# Patient Record
Sex: MALE
[De-identification: names, ages, dates, MRNs, and addresses within clinical notes are randomized per-mention and may not be internally consistent; named-entity substitution may affect disease eponyms.]

---

## 2018-09-24 ENCOUNTER — HOSPITAL ENCOUNTER (OUTPATIENT)
Dept: HOSPITAL 53 - M SMT | Age: 23
End: 2018-09-24
Attending: UROLOGY
Payer: COMMERCIAL

## 2018-09-24 DIAGNOSIS — N46.9: Primary | ICD-10-CM

## 2018-09-24 LAB
% NORMAL FORMS: (no result) % (ref 4–?)
IMMOTILITY: (no result) %
NON PROGRESSIVE MOTILITY (C): (no result) %
PROGRESSIVE MOTILITY (A): (no result) % (ref 32–?)
SAMPLE TYPE: (no result)
SEMEN APPEARANCE: (no result)
SEMEN VISCOSITY: (no result)
SEMEN VOLUME: (no result) ML (ref 4–5)
SPERM # SMN: (no result) M/ML (ref 15–?)
SPERM ABNORMAL FORMS: (no result)
SPERM#: (no result) M/EJAC (ref 39–?)
TOTAL FUNCTIONAL: (no result) M/EJAC.
TOTAL MOTILITY: (no result) % (ref 40–?)
TOTAL PROGRESSIVE SPERM: (no result) M/EJAC.
WBC # SMN AUTO: (no result) 10*3/UL

## 2021-04-21 PROBLEM — Z00.00 ENCOUNTER FOR PREVENTIVE HEALTH EXAMINATION: Status: ACTIVE | Noted: 2021-04-21

## 2021-07-12 ENCOUNTER — APPOINTMENT (OUTPATIENT)
Dept: ENDOCRINOLOGY | Facility: CLINIC | Age: 26
End: 2021-07-12

## 2021-08-30 ENCOUNTER — APPOINTMENT (OUTPATIENT)
Dept: PULMONOLOGY | Facility: CLINIC | Age: 26
End: 2021-08-30

## 2022-02-09 ENCOUNTER — APPOINTMENT (OUTPATIENT)
Dept: ENDOCRINOLOGY | Facility: CLINIC | Age: 27
End: 2022-02-09
Payer: COMMERCIAL

## 2022-02-09 ENCOUNTER — NON-APPOINTMENT (OUTPATIENT)
Age: 27
End: 2022-02-09

## 2022-02-09 VITALS
OXYGEN SATURATION: 98 % | SYSTOLIC BLOOD PRESSURE: 128 MMHG | WEIGHT: 315 LBS | BODY MASS INDEX: 44.1 KG/M2 | HEART RATE: 108 BPM | HEIGHT: 71 IN | DIASTOLIC BLOOD PRESSURE: 82 MMHG

## 2022-02-09 PROCEDURE — 99205 OFFICE O/P NEW HI 60 MIN: CPT

## 2022-02-09 NOTE — HISTORY OF PRESENT ILLNESS
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  He was diagnosed with Klinefelter's at 15 years of age.  This was suspected on physical examination by his primary care provider.  He is not sure if he ever saw a genetic counselor.  He has been on androgen replacement throughout his groin years has tried patches gels injectables.  Now he is not taking any testosterone replacement.  He admits to very poor knowledge of his condition and would like to get more counseling.  He is also keen to start a family and is looking for his fertility options.  He was in a stable relationship a few months prior and was sexually active.

## 2022-02-14 ENCOUNTER — RESULT REVIEW (OUTPATIENT)
Age: 27
End: 2022-02-14

## 2022-05-09 ENCOUNTER — APPOINTMENT (OUTPATIENT)
Dept: ENDOCRINOLOGY | Facility: CLINIC | Age: 27
End: 2022-05-09
Payer: COMMERCIAL

## 2022-05-09 VITALS
WEIGHT: 315 LBS | BODY MASS INDEX: 44.1 KG/M2 | HEART RATE: 67 BPM | DIASTOLIC BLOOD PRESSURE: 92 MMHG | SYSTOLIC BLOOD PRESSURE: 132 MMHG | HEIGHT: 71 IN | OXYGEN SATURATION: 97 % | RESPIRATION RATE: 18 BRPM

## 2022-05-09 DIAGNOSIS — Q98.4 KLINEFELTER SYNDROME, UNSPECIFIED: ICD-10-CM

## 2022-05-09 DIAGNOSIS — R79.89 OTHER SPECIFIED ABNORMAL FINDINGS OF BLOOD CHEMISTRY: ICD-10-CM

## 2022-05-09 PROCEDURE — 99214 OFFICE O/P EST MOD 30 MIN: CPT

## 2022-05-09 NOTE — HISTORY OF PRESENT ILLNESS
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  He was diagnosed with Klinefelter's at 15 years of age.  This was suspected on physical examination by his primary care provider.  He is not sure if he ever saw a genetic counselor.  He has been on androgen replacement throughout his groin years has tried patches gels injectables.  Now he is not taking any testosterone replacement.  He admits to very poor knowledge of his condition and would like to get more counseling.  He is also keen to start a family and is looking for his fertility options.  He was in a stable relationship a few months prior and was sexually active.\par \par \par 05/09/2022- FOLLOW UP\par Labs reviewed with the patient did not establish with genetics yet.  Klinefelter's is confirmed.  I have referred him to reproductive endocrine for sperm preservation.  I will check labs again for elevated prolactin levels.  If needed will order pituitary MRI.  Once we have proceed with sperm preservation will order androgen replacement.

## 2022-05-10 LAB
PROLACTIN SERPL-MCNC: 14 NG/ML
PROLACTIN SERPL-MCNC: 14.6 NG/ML

## 2022-05-17 LAB
MONOMERIC PROLACTIN (ICMA)*: 11.2 NG/ML
PERCENT MACROPROLACTIN: 24 %
PROLACTIN, SERUM (ICMA)*: 14.7 NG/ML

## 2022-11-14 ENCOUNTER — APPOINTMENT (OUTPATIENT)
Dept: ENDOCRINOLOGY | Facility: CLINIC | Age: 27
End: 2022-11-14

## 2023-07-11 ENCOUNTER — HOSPITAL ENCOUNTER (EMERGENCY)
Dept: HOSPITAL 74 - FER | Age: 28
Discharge: HOME | End: 2023-07-11
Payer: COMMERCIAL

## 2023-07-11 VITALS
DIASTOLIC BLOOD PRESSURE: 80 MMHG | TEMPERATURE: 99 F | SYSTOLIC BLOOD PRESSURE: 146 MMHG | RESPIRATION RATE: 18 BRPM | HEART RATE: 76 BPM

## 2023-07-11 VITALS — BODY MASS INDEX: 47.4 KG/M2

## 2023-07-11 DIAGNOSIS — K08.89: Primary | ICD-10-CM

## 2023-07-11 PROCEDURE — 3E0233Z INTRODUCTION OF ANTI-INFLAMMATORY INTO MUSCLE, PERCUTANEOUS APPROACH: ICD-10-PCS

## 2024-05-10 ENCOUNTER — APPOINTMENT (OUTPATIENT)
Dept: PULMONOLOGY | Facility: CLINIC | Age: 29
End: 2024-05-10
Payer: COMMERCIAL

## 2024-05-10 VITALS — HEIGHT: 72 IN | WEIGHT: 315 LBS | BODY MASS INDEX: 42.66 KG/M2 | HEART RATE: 104 BPM | OXYGEN SATURATION: 99 %

## 2024-05-10 DIAGNOSIS — G47.19 OTHER HYPERSOMNIA: ICD-10-CM

## 2024-05-10 DIAGNOSIS — Z78.9 OTHER SPECIFIED HEALTH STATUS: ICD-10-CM

## 2024-05-10 DIAGNOSIS — E66.9 OBESITY, UNSPECIFIED: ICD-10-CM

## 2024-05-10 DIAGNOSIS — R06.83 SNORING: ICD-10-CM

## 2024-05-10 DIAGNOSIS — G47.33 OBSTRUCTIVE SLEEP APNEA (ADULT) (PEDIATRIC): ICD-10-CM

## 2024-05-10 PROCEDURE — 99203 OFFICE O/P NEW LOW 30 MIN: CPT

## 2024-05-10 NOTE — HISTORY OF PRESENT ILLNESS
[FreeTextEntry1] : [facility]. 28 year old man with history of morbid obesity, klinefelter syndrome is here in the sleep center to address excessive snoring and restless sleep.  Patient is sleepy with Lawrence sleepiness score of 12.  Patient has very loud snoring which disturbs other people, also has witnessed apneas.  Patient's bedtime is around 2 AM wakes up in the morning around 11 am.   He feels rested when he wakes up.  Patient does not drink coffee. Patient does not have any headaches or nocturia. He is not sleepy while driving. 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

## 2024-07-16 ENCOUNTER — TRANSCRIPTION ENCOUNTER (OUTPATIENT)
Age: 29
End: 2024-07-16

## 2024-07-16 ENCOUNTER — APPOINTMENT (OUTPATIENT)
Dept: PULMONOLOGY | Facility: CLINIC | Age: 29
End: 2024-07-16
Payer: COMMERCIAL

## 2024-07-16 VITALS
DIASTOLIC BLOOD PRESSURE: 70 MMHG | BODY MASS INDEX: 42.66 KG/M2 | HEART RATE: 110 BPM | WEIGHT: 315 LBS | OXYGEN SATURATION: 97 % | HEIGHT: 72 IN | SYSTOLIC BLOOD PRESSURE: 110 MMHG

## 2024-07-16 DIAGNOSIS — G47.33 OBSTRUCTIVE SLEEP APNEA (ADULT) (PEDIATRIC): ICD-10-CM

## 2024-07-16 DIAGNOSIS — E66.9 OBESITY, UNSPECIFIED: ICD-10-CM

## 2024-07-16 PROCEDURE — 99213 OFFICE O/P EST LOW 20 MIN: CPT

## 2024-07-30 ENCOUNTER — APPOINTMENT (OUTPATIENT)
Dept: PULMONOLOGY | Facility: CLINIC | Age: 29
End: 2024-07-30

## 2024-08-08 ENCOUNTER — NON-APPOINTMENT (OUTPATIENT)
Age: 29
End: 2024-08-08

## 2024-08-21 ENCOUNTER — APPOINTMENT (OUTPATIENT)
Dept: BARIATRICS/WEIGHT MGMT | Facility: CLINIC | Age: 29
End: 2024-08-21

## 2024-08-21 NOTE — HISTORY OF PRESENT ILLNESS
[FreeTextEntry1] : 28M PMH class III obesity, FELIPA asthma,  who presents to weight management for initial evaluation.   PMH: Meds: inhaler Allergies: x Surgeries: chest surgery FHx: Social Hx: family, work, substance     Weight/Diet History:  Has tried numerous weight loss interventions, including intermittent fasting.    Weight today:   Diet: B (): L (): D (): Dessert: Snack: Night-time eating: Beverages: Binging: Fast-food/Restaurants: Cook/Prepare meals: Added oils: Food Journal:   Exercise: No limitations. Treadmill 1 hr at gym every morning.    Sleep:   Social Hx:   LABS? Bariaric surg???

## 2025-01-17 ENCOUNTER — APPOINTMENT (OUTPATIENT)
Dept: PULMONOLOGY | Facility: CLINIC | Age: 30
End: 2025-01-17

## 2025-01-27 ENCOUNTER — RESULT REVIEW (OUTPATIENT)
Age: 30
End: 2025-01-27
